# Patient Record
Sex: FEMALE | Race: WHITE
[De-identification: names, ages, dates, MRNs, and addresses within clinical notes are randomized per-mention and may not be internally consistent; named-entity substitution may affect disease eponyms.]

---

## 2021-06-24 LAB
ALBUMIN SERPL BCP-MCNC: 3.3 G/DL (ref 3.4–5)
ALBUMIN/GLOB SERPL: 0.8 {RATIO} (ref 1.1–1.5)
ALP SERPL-CCNC: 102 IU/L (ref 46–116)
ALT SERPL W P-5'-P-CCNC: 27 U/L (ref 30–65)
ANION GAP SERPL CALCULATED.3IONS-SCNC: 8 MMOL/L (ref 8–16)
AST SERPL W P-5'-P-CCNC: 14 U/L (ref 10–37)
BASOPHILS # BLD AUTO: 0.1 X10'3 (ref 0–0.2)
BASOPHILS NFR BLD AUTO: 0.4 % (ref 0–1)
BILIRUB SERPL-MCNC: 0.3 MG/DL (ref 0–1)
BUN SERPL-MCNC: 15 MG/DL (ref 7–18)
BUN/CREAT SERPL: 23.4 (ref 6.6–38)
CALCIUM SERPL-MCNC: 9.2 MG/DL (ref 8.5–10.1)
CHLORIDE SERPL-SCNC: 107 MMOL/L (ref 99–107)
CO2 SERPL-SCNC: 27.4 MMOL/L (ref 24–32)
CREAT SERPL-MCNC: 0.64 MG/DL (ref 0.4–0.9)
EOSINOPHIL # BLD AUTO: 0.5 X10'3 (ref 0–0.9)
EOSINOPHIL NFR BLD AUTO: 3.8 % (ref 0–6)
ERYTHROCYTE [DISTWIDTH] IN BLOOD BY AUTOMATED COUNT: 15.9 % (ref 11.5–14.5)
GFR SERPL CREATININE-BSD FRML MDRD: > 90 ML/MIN
GLUCOSE SERPL-MCNC: 132 MG/DL (ref 70–104)
HCG SERPL QL: NEGATIVE
HCT VFR BLD AUTO: 44.9 % (ref 35–45)
HGB BLD-MCNC: 15.2 G/DL (ref 12–16)
INR PPP: 1 INR
LYMPHOCYTES # BLD AUTO: 3.8 X10'3 (ref 1.1–4.8)
LYMPHOCYTES NFR BLD AUTO: 32 % (ref 21–51)
MCH RBC QN AUTO: 32.3 PG (ref 27–31)
MCHC RBC AUTO-ENTMCNC: 33.9 G/DL (ref 33–36.5)
MCV RBC AUTO: 95.3 FL (ref 78–98)
MONOCYTES # BLD AUTO: 0.6 X10'3 (ref 0–0.9)
MONOCYTES NFR BLD AUTO: 5.4 % (ref 2–12)
NEUTROPHILS # BLD AUTO: 6.9 X10'3 (ref 1.8–7.7)
NEUTROPHILS NFR BLD AUTO: 58.4 % (ref 42–75)
PLATELET # BLD AUTO: 345 X10'3 (ref 140–440)
PMV BLD AUTO: 7.3 FL (ref 7.4–10.4)
POTASSIUM SERPL-SCNC: 3.6 MMOL/L (ref 3.4–5.1)
PROT SERPL-MCNC: 7.2 G/DL (ref 6.4–8.2)
PROTHROMBIN TIME: 10.4 SECONDS (ref 9–12)
RBC # BLD AUTO: 4.71 X10'6 (ref 4.2–5.6)
SODIUM SERPL-SCNC: 142 MMOL/L (ref 135–145)

## 2021-06-25 LAB — APTT PPP: 34 SECONDS (ref 22–32)

## 2021-07-02 ENCOUNTER — HOSPITAL ENCOUNTER (OUTPATIENT)
Dept: HOSPITAL 94 - PAS | Age: 56
Discharge: HOME | End: 2021-07-02
Attending: OBSTETRICS & GYNECOLOGY
Payer: COMMERCIAL

## 2021-07-02 VITALS — DIASTOLIC BLOOD PRESSURE: 78 MMHG | SYSTOLIC BLOOD PRESSURE: 145 MMHG

## 2021-07-02 VITALS — SYSTOLIC BLOOD PRESSURE: 136 MMHG | DIASTOLIC BLOOD PRESSURE: 76 MMHG

## 2021-07-02 VITALS — SYSTOLIC BLOOD PRESSURE: 160 MMHG | DIASTOLIC BLOOD PRESSURE: 96 MMHG

## 2021-07-02 VITALS — HEIGHT: 64 IN | WEIGHT: 277.56 LBS | BODY MASS INDEX: 47.39 KG/M2

## 2021-07-02 VITALS — DIASTOLIC BLOOD PRESSURE: 75 MMHG | SYSTOLIC BLOOD PRESSURE: 136 MMHG

## 2021-07-02 VITALS — DIASTOLIC BLOOD PRESSURE: 83 MMHG | SYSTOLIC BLOOD PRESSURE: 149 MMHG

## 2021-07-02 VITALS — SYSTOLIC BLOOD PRESSURE: 143 MMHG | DIASTOLIC BLOOD PRESSURE: 82 MMHG

## 2021-07-02 VITALS — DIASTOLIC BLOOD PRESSURE: 86 MMHG | SYSTOLIC BLOOD PRESSURE: 151 MMHG

## 2021-07-02 DIAGNOSIS — I10: ICD-10-CM

## 2021-07-02 DIAGNOSIS — E11.9: ICD-10-CM

## 2021-07-02 DIAGNOSIS — Z90.49: ICD-10-CM

## 2021-07-02 DIAGNOSIS — Z79.01: ICD-10-CM

## 2021-07-02 DIAGNOSIS — Z98.890: ICD-10-CM

## 2021-07-02 DIAGNOSIS — Z79.899: ICD-10-CM

## 2021-07-02 DIAGNOSIS — G47.30: ICD-10-CM

## 2021-07-02 DIAGNOSIS — Z98.51: ICD-10-CM

## 2021-07-02 DIAGNOSIS — Z87.442: ICD-10-CM

## 2021-07-02 DIAGNOSIS — F17.210: ICD-10-CM

## 2021-07-02 DIAGNOSIS — Z88.0: ICD-10-CM

## 2021-07-02 DIAGNOSIS — N84.0: ICD-10-CM

## 2021-07-02 DIAGNOSIS — Z90.721: ICD-10-CM

## 2021-07-02 DIAGNOSIS — N95.0: Primary | ICD-10-CM

## 2021-07-02 DIAGNOSIS — Z88.8: ICD-10-CM

## 2021-07-02 DIAGNOSIS — E66.01: ICD-10-CM

## 2021-07-02 DIAGNOSIS — N72: ICD-10-CM

## 2021-07-02 PROCEDURE — 85610 PROTHROMBIN TIME: CPT

## 2021-07-02 PROCEDURE — 80053 COMPREHEN METABOLIC PANEL: CPT

## 2021-07-02 PROCEDURE — 93005 ELECTROCARDIOGRAM TRACING: CPT

## 2021-07-02 PROCEDURE — 82948 REAGENT STRIP/BLOOD GLUCOSE: CPT

## 2021-07-02 PROCEDURE — 71046 X-RAY EXAM CHEST 2 VIEWS: CPT

## 2021-07-02 PROCEDURE — 85730 THROMBOPLASTIN TIME PARTIAL: CPT

## 2021-07-02 PROCEDURE — 58563 HYSTEROSCOPY ABLATION: CPT

## 2021-07-02 PROCEDURE — 36415 COLL VENOUS BLD VENIPUNCTURE: CPT

## 2021-07-02 PROCEDURE — 84703 CHORIONIC GONADOTROPIN ASSAY: CPT

## 2021-07-02 PROCEDURE — 93306 TTE W/DOPPLER COMPLETE: CPT

## 2021-07-02 PROCEDURE — 85025 COMPLETE CBC W/AUTO DIFF WBC: CPT

## 2021-07-02 NOTE — NUR
PT UP, VOIDED, ABLE TO AMBULATE SAFELY. D/C INSTRUCTIONS GIVEN AND GONE OVER W/PT WHO 
VERBALIZED UNDERSTANDING. PT D/CD TO HOME W/GLASSES, C-PAP, CLOTHING VIA W/C TO PRIVATE 
VEHICLE W/O INCIDENT.

-------------------------------------------------------------------------------

Addendum: 07/02/21 at 1149 by Irina Wong RN

-------------------------------------------------------------------------------

Amended: Links added.

## 2021-07-02 NOTE — NUR
Received from OR via [facility], accompanied by Anesthesiologist DR SMITH and report given 
by Anesthesiologist. PT DROWSY, DENIES PAIN, CECI PAD IN PLACE.

-------------------------------------------------------------------------------

Addendum: 07/02/21 at 1144 by Irina Wong RN

-------------------------------------------------------------------------------

Amended: Links added.